# Patient Record
Sex: FEMALE | Race: WHITE | NOT HISPANIC OR LATINO | Employment: FULL TIME | URBAN - METROPOLITAN AREA
[De-identification: names, ages, dates, MRNs, and addresses within clinical notes are randomized per-mention and may not be internally consistent; named-entity substitution may affect disease eponyms.]

---

## 2018-05-30 ENCOUNTER — OFFICE VISIT (OUTPATIENT)
Dept: MEDICAL GROUP | Facility: PHYSICIAN GROUP | Age: 23
End: 2018-05-30
Payer: COMMERCIAL

## 2018-05-30 VITALS
WEIGHT: 159.8 LBS | RESPIRATION RATE: 16 BRPM | BODY MASS INDEX: 26.62 KG/M2 | OXYGEN SATURATION: 97 % | HEART RATE: 80 BPM | SYSTOLIC BLOOD PRESSURE: 120 MMHG | HEIGHT: 65 IN | TEMPERATURE: 97.8 F | DIASTOLIC BLOOD PRESSURE: 78 MMHG
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
## 2018-05-30 DIAGNOSIS — M25.562 ACUTE PAIN OF LEFT KNEE: ICD-10-CM

## 2018-05-30 PROCEDURE — 99204 OFFICE O/P NEW MOD 45 MIN: CPT | Performed by: NURSE PRACTITIONER

## 2018-05-30 RX ORDER — METHOCARBAMOL 500 MG/1
1000 TABLET, FILM COATED ORAL 4 TIMES DAILY
COMMUNITY
End: 2020-06-16

## 2018-05-30 ASSESSMENT — ENCOUNTER SYMPTOMS
FEVER: 0
BLOOD IN STOOL: 0
ABDOMINAL PAIN: 0
HEADACHES: 0
NUMBNESS: 1
DIZZINESS: 0
CONSTIPATION: 0
WEAKNESS: 0
JOINT SWELLING: 0
PALPITATIONS: 0
SHORTNESS OF BREATH: 0
VOMITING: 0
NAUSEA: 0
FOCAL WEAKNESS: 0
MYALGIAS: 1

## 2018-05-30 ASSESSMENT — PATIENT HEALTH QUESTIONNAIRE - PHQ9: CLINICAL INTERPRETATION OF PHQ2 SCORE: 0
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
## 2018-05-30 NOTE — LETTER
May 30, 2018         Patient: Rodrigo Kelly   YOB: 1995   Date of Visit: 5/30/2018           To Whom it May Concern:    Rodrigo Kelly was seen in my clinic on 5/30/2018. In my professional opinion, the patient is stable and able to return to work without restrictions.    If you have any questions or concerns, please don't hesitate to call.        Sincerely,           ABHISHEK Levine.  Electronically Signed
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
## 2020-06-16 ENCOUNTER — HOSPITAL ENCOUNTER (EMERGENCY)
Facility: MEDICAL CENTER | Age: 25
End: 2020-06-16
Attending: EMERGENCY MEDICINE
Payer: COMMERCIAL

## 2020-06-16 VITALS
SYSTOLIC BLOOD PRESSURE: 128 MMHG | RESPIRATION RATE: 18 BRPM | BODY MASS INDEX: 26.01 KG/M2 | WEIGHT: 152.34 LBS | HEIGHT: 64 IN | HEART RATE: 75 BPM | TEMPERATURE: 97.8 F | OXYGEN SATURATION: 98 % | DIASTOLIC BLOOD PRESSURE: 75 MMHG

## 2020-06-16 DIAGNOSIS — T14.8XXA BRUISING: ICD-10-CM

## 2020-06-16 PROCEDURE — 99282 EMERGENCY DEPT VISIT SF MDM: CPT

## 2020-06-16 NOTE — ED NOTES
Pt discharged home. Explained discharge instructions. Questions and comments addressed. Pt verbalized understanding of instructions. Pt advised to follow-up with plastic surgeon or return to ED for any new or worsening of symptoms. Pt is ambulating well and steady on feet. VS stable. Pt's SO at bedside and will be driving pt home.

## 2020-06-16 NOTE — ED NOTES
Pt ambulatory to [facility]. Agree with triage note. LEFT breast noted to have hematoma, pt denies pain.  Dr Velásquez at bedside now for initial assessment, this RN at bedside for chaperone.

## 2020-06-16 NOTE — ED PROVIDER NOTES
ED Provider Note    Scribed for Federico Velásquez M.D. by Fahad Benz. 6/16/2020  3:18 PM    Primary care provider: HECTOR Levine  Means of arrival: Walk in  History obtained from: Patient  History limited by: None    CHIEF COMPLAINT  Chief Complaint   Patient presents with   • Wound Check     hematoma to left medial breast   • Post-Op Complications     breast augmentation 1 month ago   • Sent by MD Yan Judge plastic surgeon       HPI  Rodrigo Kelly is a 24 y.o. female who presents to the Emergency Department for evaluation of post-op complications. Patient had breast implants placed two weeks ago by a plastic surgeon in [place], [place]. States she developed a hematoma to the lateral aspect of her left breast. She is concerned that it has shifted underneath the breast, as the area now appears darker in color. Patient states her surgeon in [place] is monitoring her but sent her to the ED here for further evaluation. She denies breast pain. She otherwise does not report any other associated symptoms at this time.    PPE Note: I personally donned PPE for all patient encounters during this visit, including being clean-shaven with a surgical mask, gloves, and eye protection.     Scribe remained outside the patient's room and did not have any contact with the patient for the duration of patient encounter.       REVIEW OF SYSTEMS  Pertinent positives include hematoma to left breast area (appears darker in color). Pertinent negatives include no breast pain.      PAST MEDICAL HISTORY   None noted    SURGICAL HISTORY   has a past surgical history that includes tonsillectomy (1998) and breast reconstruction.    SOCIAL HISTORY  Social History     Tobacco Use   • Smoking status: Never Smoker   • Smokeless tobacco: Never Used   Substance Use Topics   • Alcohol use: Yes     Alcohol/week: 3.0 oz     Types: 5 Shots of liquor per week     Comment: pt states she quit  2 weeks ago   • Drug use: No     "  Social History     Substance and Sexual Activity   Drug Use No       FAMILY HISTORY  Family History   Problem Relation Age of Onset   • No Known Problems Mother    • No Known Problems Father    • No Known Problems Brother    • Diabetes Maternal Grandfather    • Heart Disease Maternal Grandfather    • No Known Problems Brother        CURRENT MEDICATIONS  Home Medications     Reviewed by Selene Lam R.N. (Registered Nurse) on 06/16/20 at 1501  Med List Status: Complete   Medication Last Dose Status        Patient Holger Taking any Medications                       ALLERGIES  No Known Allergies    PHYSICAL EXAM  VITAL SIGNS: /83   Pulse 73   Temp 36.4 °C (97.5 °F) (Temporal)   Resp 16   Ht 1.626 m (5' 4\")   Wt 69.1 kg (152 lb 5.4 oz)   LMP 06/15/2020   SpO2 100%   BMI 26.15 kg/m²   Constitutional: Well developed, Well nourished, No acute distress, Non-toxic appearance.   HENT: Normocephalic, Atraumatic.  Oropharynx moist.   Eyes: PERRL, EOMI, Conjunctiva normal, No discharge.   CV: Good pulses  Thorax & Lungs: No respiratory distress.   Skin: Warm, Dry, No erythema, No rash.   Breast: Greenish discoloration underneath the left breast that appears to be an old hematoma, cannot palpate any fluid collection. Female nurse was present in the room during exam as chaperone.   Musculoskeletal: No major deformities noted.   Neurologic: Awake, alert. Moves all extremities spontaneously.  Psychiatric: Affect normal, Mood normal.    COURSE & MEDICAL DECISION MAKING  Nursing notes, VS, PMSFHx reviewed in chart.    3:18 PM - Patient seen and examined at bedside.     Decision Making:  Old appearing hematoma underneath the left breast, I believe that it is likely just in the healing process, is only been 2-week, therefore I believe she needs to give it ample time to heal, will refer the patient to plastic surgery, have the patient return with worsening symptoms.    The patient will return for new or worsening " symptoms and is stable at the time of discharge.    The patient is referred to a primary physician for blood pressure management, diabetic screening, and for all other preventative health concerns.    DISPOSITION:  Patient will be discharged home in stable condition.    FOLLOW UP:  [facility], Emergency Dept  [address] [id number] [phone]        Atul Murillo M.D.  [address]  [place] [address]  [phone]            FINAL IMPRESSION  1. Bruising          Fahad SCHMITZ (Scribe), am scribing for, and in the presence of, Federico Velásquez M.D..    Electronically signed by: Fahad Benz (Scribe), 6/16/2020    IFederico M.D. personally performed the services described in this documentation, as scribed by Fahad Benz in my presence, and it is both accurate and complete. E    The note accurately reflects work and decisions made by me.  Federico Velásquez M.D.  6/16/2020  4:27 PM

## 2020-06-16 NOTE — ED TRIAGE NOTES
Rodrigo Marisabel Kelly  Chief Complaint   Patient presents with   • Wound Check     hematoma to left medial breast   • Post-Op Complications     breast augmentation 1 month ago   • Sent by MD Yan Judge plastic surgeon     Pt ambulatory to triage with above complaint.  VSS, no acute distress.    Pt states developed a hematoma after breast implants placed 2 weeks ago,  Increased bruising to (L) medial breast that pt states is darker in color.  Denies pain,  States her surgeon in [facility] sent her to ER for further evaluation.   Pt denies fever/ cough or contact with anyone positive for Covid/ Corona.  Pt/staff masked and in appropriate PPE during encounter.   Pt returned to lobby, educated on triage process, and to inform staff of any changes or concerns.

## 2020-06-16 NOTE — ED NOTES
Patient walked with a steady gate at this time to er [facility] area room 78. Placed patient into gown, gave warm blanket, and call light. Ready to be seen